# Patient Record
Sex: FEMALE | Race: WHITE
[De-identification: names, ages, dates, MRNs, and addresses within clinical notes are randomized per-mention and may not be internally consistent; named-entity substitution may affect disease eponyms.]

---

## 2021-12-07 ENCOUNTER — HOSPITAL ENCOUNTER (OUTPATIENT)
Dept: HOSPITAL 95 - ORSCMMR | Age: 46
Discharge: HOME | End: 2021-12-07
Attending: SURGERY
Payer: COMMERCIAL

## 2021-12-07 VITALS — BODY MASS INDEX: 24.07 KG/M2 | HEIGHT: 72 IN | WEIGHT: 177.69 LBS

## 2021-12-07 DIAGNOSIS — Z87.891: ICD-10-CM

## 2021-12-07 DIAGNOSIS — E66.9: ICD-10-CM

## 2021-12-07 DIAGNOSIS — K60.2: ICD-10-CM

## 2021-12-07 DIAGNOSIS — Z79.899: ICD-10-CM

## 2021-12-07 DIAGNOSIS — K64.5: ICD-10-CM

## 2021-12-07 DIAGNOSIS — F41.8: ICD-10-CM

## 2021-12-07 DIAGNOSIS — K62.5: Primary | ICD-10-CM

## 2021-12-07 DIAGNOSIS — J45.909: ICD-10-CM

## 2021-12-07 PROCEDURE — 0DJD8ZZ INSPECTION OF LOWER INTESTINAL TRACT, VIA NATURAL OR ARTIFICIAL OPENING ENDOSCOPIC: ICD-10-PCS | Performed by: SURGERY

## 2021-12-07 NOTE — NUR
12/07/21 1333 [address]
History, Chart, Medications and Allergies reviewed before start of
procedure. Patient confirms NPO status and agrees with scheduled
surgery. 3-LEAD EKG REVIEWED WITH PHYSICIAN PRIOR TO START OF
PROCEDURE. MONITOR INTACT WITH CONTINUOUS PULSE OXIMETRY AND
INTERMITTENT BP. PATIENT DETERMINED TO BE ASA APPROPRIATE FOR
PROPOFOL SEDATION PRIOR TO START OF PROCEDURE BY .

## 2021-12-07 NOTE — NUR
Discharge instructions reviewed with patient. Patient verbalizes understanding.
Copy given to patient to take home.
Patient States Post-Procedure ride home has been arranged.
Discharged via wheelchair to private car for ride home.
PT STABLE DENIES PAIN AND NAUSEA HAS STABLE GAIT DC HOME  IS THE